# Patient Record
Sex: FEMALE | Race: WHITE | NOT HISPANIC OR LATINO | Employment: STUDENT | URBAN - METROPOLITAN AREA
[De-identification: names, ages, dates, MRNs, and addresses within clinical notes are randomized per-mention and may not be internally consistent; named-entity substitution may affect disease eponyms.]

---
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
## 2020-10-06 ENCOUNTER — PATIENT MESSAGE (OUTPATIENT)
Dept: ADMINISTRATIVE | Facility: HOSPITAL | Age: 20
End: 2020-10-06

## 2021-06-29 ENCOUNTER — PATIENT OUTREACH (OUTPATIENT)
Dept: ADMINISTRATIVE | Facility: HOSPITAL | Age: 21
End: 2021-06-29

## 2022-09-05 NOTE — LETTER
November 5, 2018      [facility]  [address] [place] [id number]  Phone: [phone]  Fax: [phone]       Patient: Gayatri Galeana   YOB: 2000  Date of Visit: 11/05/2018    To Whom It May Concern:    Faith Galeana  was at [facility] on 11/05/2018. She may return to work/school on 11/6/18 with no restrictions. If you have any questions or concerns, or if I can be of further assistance, please do not hesitate to contact me.    Sincerely,    Julianna Bryant PA-C      05-Sep-2022 20:23